# Patient Record
Sex: FEMALE | Race: WHITE | NOT HISPANIC OR LATINO | Employment: FULL TIME | URBAN - METROPOLITAN AREA
[De-identification: names, ages, dates, MRNs, and addresses within clinical notes are randomized per-mention and may not be internally consistent; named-entity substitution may affect disease eponyms.]

---

## 2023-03-24 ENCOUNTER — TELEPHONE (OUTPATIENT)
Dept: TRANSPLANT | Facility: CLINIC | Age: 63
End: 2023-03-24

## 2023-03-24 NOTE — TELEPHONE ENCOUNTER
Lung transplant referral received from Dr. Salter's office.  Message sent to provider.  Awaiting recommendations.

## 2023-03-24 NOTE — TELEPHONE ENCOUNTER
----- Message from Anibal Rodriguez MA sent at 3/24/2023  2:03 PM CDT -----  Regarding: Patient status  Contact: [phone]  Patient is calling to ask some questions, she has paperwork that was faxed over for transplant. Please call and advise.    Contacted patient.  She had questions regarding the evaluation testing for lung transplantation.  Notified patient of our transplant process. Informed her that the referral was received this week and I am waiting on review from Dr. Chong.  Informed her that if a consult appointment is warranted, then financial clearance will be obtained from her insurance for the consult visit and testing.  Notified her of the potential testing that may be ordered.  Informed patient that if she is in the window for lung transplant consideration during the initial visit, then financial clearance for the evaluation testing will need to be scheduled.  Informed patient that I will contact her with the outcome of the review.  She verbalized her complete understanding.

## 2023-03-27 ENCOUNTER — TELEPHONE (OUTPATIENT)
Dept: TRANSPLANT | Facility: CLINIC | Age: 63
End: 2023-03-27
Payer: COMMERCIAL

## 2023-03-27 NOTE — TELEPHONE ENCOUNTER
"3/27/23 - Message sent to [facility] regarding financial clearance for consult with PFTs, 6 minute walk test, CT of chest and TTE.    ----- Message from Abigail Chong DO sent at 3/25/2023  9:58 AM CDT -----  Regarding: RE: Lung transplant referral    Appropriate for LUT clinic.  Will need PFTs, 6MWT, CT chest, and TTE.  Thanks    ----- Message -----  From: Bindu Sterling RN  Sent: 3/24/2023   4:04 PM CDT  To: Abigail Chong DO  Subject: Lung transplant referral                         Lung Transplant Referral Note    Referral from:  Tahir Salter MD    Lung diagnosis: COPD/Emphysema,     Age: 63 y.o.    Height/Weight/BMI:  HT:  5'3" / WT:  67.59 kg / BMI 26.39    Smoking history: Social History    Tobacco Use - former smoker      Smoking status: 1 ppd x 25 years, quit 2006      Smokeless tobacco: Not on file    PFT date:  03/13/23  FVC         2.46 (81%)  FEV1       0.83 (35%)  FEV1/FVC    34%  DLCO      8.48 (42%)    CXR date: not received  Impression:    Chest CT date: 03/31/21  Impression:  COPD. Old calcified granulomatous disease changes. No acute disease.    Echo date:   not received  Impression:    Other pertinent medical history:  HTN, DM, hypercholesterolemia.    Recommendations?          "

## 2023-03-28 NOTE — TELEPHONE ENCOUNTER
"----- Message from Abigail Chong DO sent at 3/28/2023  8:47 AM CDT -----  Regarding: RE: Lung transplant referral    Yes she can bring a copy of her CT chest if it's already scheduled.  No need for repeat PFTs.  Will need 6MWT though if that was not done as well.  Thanks    ----- Message -----  From: Bindu Sterling RN  Sent: 3/27/2023   3:40 PM CDT  To: Abigail Chong DO  Subject: RE: Lung transplant referral                     I spoke with patient on Friday and she told me that she is scheduled for a CT of the chest this week for surveillance.  If she has the CT of the chest locally this week, can she just bring the images on CD?  Also do we need to repeat PFTs since she just had testing on 3/13/23?  Patient was asking?    ----- Message -----  From: Abigail Chong DO  Sent: 3/25/2023   9:59 AM CDT  To: Bindu Sterling RN  Subject: RE: Lung transplant referral                     Appropriate for LUT clinic.  Will need PFTs, 6MWT, CT chest, and TTE.  Thanks    ----- Message -----  From: Bindu Sterling RN  Sent: 3/24/2023   4:04 PM CDT  To: Abigail Chong DO  Subject: Lung transplant referral                         Lung Transplant Referral Note    Referral from:  Tahir Salter MD    Lung diagnosis: COPD/Emphysema,     Age: 63 y.o.    Height/Weight/BMI:  HT:  5'3" / WT:  67.59 kg / BMI 26.39    Smoking history: Social History    Tobacco Use - former smoker      Smoking status: 1 ppd x 25 years, quit 2006      Smokeless tobacco: Not on file    PFT date:  03/13/23  FVC         2.46 (81%)  FEV1       0.83 (35%)  FEV1/FVC    34%  DLCO      8.48 (42%)    CXR date: not received  Impression:    Chest CT date: 03/31/21  Impression:  COPD. Old calcified granulomatous disease changes. No acute disease.    Echo date:   not received  Impression:    Other pertinent medical history:  HTN, DM, hypercholesterolemia.    Recommendations?              "

## 2023-03-31 ENCOUNTER — TELEPHONE (OUTPATIENT)
Dept: TRANSPLANT | Facility: CLINIC | Age: 63
End: 2023-03-31
Payer: COMMERCIAL

## 2023-03-31 DIAGNOSIS — Z76.82 LUNG TRANSPLANT CANDIDATE: ICD-10-CM

## 2023-03-31 DIAGNOSIS — J44.9 CHRONIC OBSTRUCTIVE PULMONARY DISEASE, UNSPECIFIED COPD TYPE: Primary | ICD-10-CM

## 2023-03-31 NOTE — TELEPHONE ENCOUNTER
----- Message from Des Briceño sent at 3/31/2023  1:03 PM CDT -----  Regarding: call back  Pt call to speak with Bindu    Call     Contacted patient and notified her that financial clearance for the consult has been received.  Informed her that Dr. Chong wants her to have an echo and a 6 minute walk test.  Instructed her to bring a CD copy of the CT of the chest.  She verbalized her understanding and stated that she already has the CD of the CT of the chest.  Informed her that an appointment will be scheduled as soon as possible, but it will depend on the availability of the echo.  Informed her that Dr. Chong will be out of the office next Thursday through the following week.  Informed her that she will return on 4/17/23.  Informed her that we could schedule an appointment with Dr. Chong's partner if she would like.  Patient declined and stated that she wanted to see Dr. Chong.  Informed patient that the  will contact her with the date and times of the appointment.  She verbalized her understanding.

## 2023-04-04 ENCOUNTER — TELEPHONE (OUTPATIENT)
Dept: TRANSPLANT | Facility: CLINIC | Age: 63
End: 2023-04-04
Payer: COMMERCIAL

## 2023-04-13 ENCOUNTER — DOCUMENTATION ONLY (OUTPATIENT)
Dept: TRANSPLANT | Facility: CLINIC | Age: 63
End: 2023-04-13
Payer: COMMERCIAL

## 2023-04-17 ENCOUNTER — HOSPITAL ENCOUNTER (OUTPATIENT)
Dept: PULMONOLOGY | Facility: CLINIC | Age: 63
Discharge: HOME | End: 2023-04-17
Payer: COMMERCIAL

## 2023-04-17 ENCOUNTER — HOSPITAL ENCOUNTER (OUTPATIENT)
Dept: CARDIOLOGY | Facility: HOSPITAL | Age: 63
Discharge: HOME | End: 2023-04-17
Attending: INTERNAL MEDICINE
Payer: COMMERCIAL

## 2023-04-17 ENCOUNTER — OFFICE VISIT (OUTPATIENT)
Dept: TRANSPLANT | Facility: CLINIC | Age: 63
End: 2023-04-17
Payer: COMMERCIAL

## 2023-04-17 VITALS
BODY MASS INDEX: 25.62 KG/M2 | WEIGHT: 143.31 LBS | OXYGEN SATURATION: 98 % | HEIGHT: 63 IN | HEIGHT: 63 IN | DIASTOLIC BLOOD PRESSURE: 62 MMHG | SYSTOLIC BLOOD PRESSURE: 95 MMHG | WEIGHT: 144.63 LBS | BODY MASS INDEX: 25.39 KG/M2 | SYSTOLIC BLOOD PRESSURE: 109 MMHG | DIASTOLIC BLOOD PRESSURE: 55 MMHG | HEART RATE: 109 BPM | TEMPERATURE: 98 F | HEART RATE: 122 BPM | RESPIRATION RATE: 20 BRPM

## 2023-04-17 VITALS — WEIGHT: 144.63 LBS | BODY MASS INDEX: 25.62 KG/M2 | HEIGHT: 63 IN

## 2023-04-17 DIAGNOSIS — Z76.82 LUNG TRANSPLANT CANDIDATE: ICD-10-CM

## 2023-04-17 DIAGNOSIS — J44.9 CHRONIC OBSTRUCTIVE PULMONARY DISEASE, UNSPECIFIED COPD TYPE: ICD-10-CM

## 2023-04-17 DIAGNOSIS — Z01.818 ENCOUNTER FOR PRE-TRANSPLANT EVALUATION FOR LUNG TRANSPLANT: ICD-10-CM

## 2023-04-17 DIAGNOSIS — J43.9 PULMONARY EMPHYSEMA, UNSPECIFIED EMPHYSEMA TYPE: ICD-10-CM

## 2023-04-17 DIAGNOSIS — J44.9 CHRONIC OBSTRUCTIVE PULMONARY DISEASE, UNSPECIFIED COPD TYPE: Primary | ICD-10-CM

## 2023-04-17 LAB
ASCENDING AORTA: 3.69 CM
AV INDEX (PROSTH): 0.97
AV MEAN GRADIENT: 3 MMHG
AV PEAK GRADIENT: 6 MMHG
AV VALVE AREA: 3.27 CM2
AV VELOCITY RATIO: 0.92
BSA FOR ECHO PROCEDURE: 1.71 M2
CV ECHO LV RWT: 0.3 CM
DOP CALC AO PEAK VEL: 1.19 M/S
DOP CALC AO VTI: 18.59 CM
DOP CALC LVOT AREA: 3.4 CM2
DOP CALC LVOT DIAMETER: 2.07 CM
DOP CALC LVOT PEAK VEL: 1.09 M/S
DOP CALC LVOT STROKE VOLUME: 60.88 CM3
DOP CALCLVOT PEAK VEL VTI: 18.1 CM
E WAVE DECELERATION TIME: 117.17 MSEC
E/A RATIO: 0.62
E/E' RATIO: 10.46 M/S
ECHO LV POSTERIOR WALL: 0.68 CM (ref 0.6–1.1)
EJECTION FRACTION: 60 %
FRACTIONAL SHORTENING: 39 % (ref 28–44)
INTERVENTRICULAR SEPTUM: 0.67 CM (ref 0.6–1.1)
LA MAJOR: 3.94 CM
LA MINOR: 4.05 CM
LA WIDTH: 2.73 CM
LEFT ATRIUM SIZE: 3.12 CM
LEFT ATRIUM VOLUME INDEX MOD: 10.5 ML/M2
LEFT ATRIUM VOLUME INDEX: 17.2 ML/M2
LEFT ATRIUM VOLUME MOD: 17.58 CM3
LEFT ATRIUM VOLUME: 28.92 CM3
LEFT INTERNAL DIMENSION IN SYSTOLE: 2.77 CM (ref 2.1–4)
LEFT VENTRICLE DIASTOLIC VOLUME INDEX: 56.76 ML/M2
LEFT VENTRICLE DIASTOLIC VOLUME: 95.36 ML
LEFT VENTRICLE MASS INDEX: 56 G/M2
LEFT VENTRICLE SYSTOLIC VOLUME INDEX: 17.1 ML/M2
LEFT VENTRICLE SYSTOLIC VOLUME: 28.79 ML
LEFT VENTRICULAR INTERNAL DIMENSION IN DIASTOLE: 4.56 CM (ref 3.5–6)
LEFT VENTRICULAR MASS: 93.46 G
LV LATERAL E/E' RATIO: 8.5 M/S
LV SEPTAL E/E' RATIO: 13.6 M/S
MV A" WAVE DURATION": 8.18 MSEC
MV PEAK A VEL: 1.09 M/S
MV PEAK E VEL: 0.68 M/S
MV STENOSIS PRESSURE HALF TIME: 33.98 MS
MV VALVE AREA P 1/2 METHOD: 6.47 CM2
PISA TR MAX VEL: 2.34 M/S
PULM VEIN S/D RATIO: 1.47
PV PEAK D VEL: 0.92 M/S
PV PEAK S VEL: 1.35 M/S
RA MAJOR: 3.94 CM
RA PRESSURE: 3 MMHG
RA WIDTH: 3.2 CM
RIGHT VENTRICULAR END-DIASTOLIC DIMENSION: 3.59 CM
RV TISSUE DOPPLER FREE WALL SYSTOLIC VELOCITY 1 (APICAL 4 CHAMBER VIEW): 14.29 CM/S
SINUS: 3.45 CM
STJ: 2.58 CM
TDI LATERAL: 0.08 M/S
TDI SEPTAL: 0.05 M/S
TDI: 0.07 M/S
TR MAX PG: 22 MMHG
TRICUSPID ANNULAR PLANE SYSTOLIC EXCURSION: 1.78 CM
TV REST PULMONARY ARTERY PRESSURE: 25 MMHG

## 2023-04-17 PROCEDURE — 93306 TTE W/DOPPLER COMPLETE: CPT | Mod: TXP

## 2023-04-17 PROCEDURE — 3008F BODY MASS INDEX DOCD: CPT | Mod: CPTII,S$GLB,TXP, | Performed by: INTERNAL MEDICINE

## 2023-04-17 PROCEDURE — 99205 OFFICE O/P NEW HI 60 MIN: CPT | Mod: 25,S$GLB,TXP, | Performed by: INTERNAL MEDICINE

## 2023-04-17 PROCEDURE — 99999 PR PBB SHADOW E&M-EST. PATIENT-LVL III: CPT | Mod: PBBFAC,TXP,, | Performed by: INTERNAL MEDICINE

## 2023-04-17 PROCEDURE — 94618 PULMONARY STRESS TESTING: CPT | Mod: S$GLB,TXP,, | Performed by: INTERNAL MEDICINE

## 2023-04-17 PROCEDURE — 4010F PR ACE/ARB THEARPY RXD/TAKEN: ICD-10-PCS | Mod: CPTII,S$GLB,TXP, | Performed by: INTERNAL MEDICINE

## 2023-04-17 PROCEDURE — 1160F PR REVIEW ALL MEDS BY PRESCRIBER/CLIN PHARMACIST DOCUMENTED: ICD-10-PCS | Mod: CPTII,S$GLB,TXP, | Performed by: INTERNAL MEDICINE

## 2023-04-17 PROCEDURE — 3078F PR MOST RECENT DIASTOLIC BLOOD PRESSURE < 80 MM HG: ICD-10-PCS | Mod: CPTII,S$GLB,TXP, | Performed by: INTERNAL MEDICINE

## 2023-04-17 PROCEDURE — 93306 ECHO (CUPID ONLY): ICD-10-PCS | Mod: 26,TXP,, | Performed by: INTERNAL MEDICINE

## 2023-04-17 PROCEDURE — 94618 PULMONARY STRESS TESTING: ICD-10-PCS | Mod: S$GLB,TXP,, | Performed by: INTERNAL MEDICINE

## 2023-04-17 PROCEDURE — 3074F SYST BP LT 130 MM HG: CPT | Mod: CPTII,S$GLB,TXP, | Performed by: INTERNAL MEDICINE

## 2023-04-17 PROCEDURE — 3078F DIAST BP <80 MM HG: CPT | Mod: CPTII,S$GLB,TXP, | Performed by: INTERNAL MEDICINE

## 2023-04-17 PROCEDURE — 93306 TTE W/DOPPLER COMPLETE: CPT | Mod: 26,TXP,, | Performed by: INTERNAL MEDICINE

## 2023-04-17 PROCEDURE — 1159F MED LIST DOCD IN RCRD: CPT | Mod: CPTII,S$GLB,TXP, | Performed by: INTERNAL MEDICINE

## 2023-04-17 PROCEDURE — 99205 PR OFFICE/OUTPT VISIT, NEW, LEVL V, 60-74 MIN: ICD-10-PCS | Mod: 25,S$GLB,TXP, | Performed by: INTERNAL MEDICINE

## 2023-04-17 PROCEDURE — 1159F PR MEDICATION LIST DOCUMENTED IN MEDICAL RECORD: ICD-10-PCS | Mod: CPTII,S$GLB,TXP, | Performed by: INTERNAL MEDICINE

## 2023-04-17 PROCEDURE — 99999 PR PBB SHADOW E&M-EST. PATIENT-LVL III: ICD-10-PCS | Mod: PBBFAC,TXP,, | Performed by: INTERNAL MEDICINE

## 2023-04-17 PROCEDURE — 4010F ACE/ARB THERAPY RXD/TAKEN: CPT | Mod: CPTII,S$GLB,TXP, | Performed by: INTERNAL MEDICINE

## 2023-04-17 PROCEDURE — 3074F PR MOST RECENT SYSTOLIC BLOOD PRESSURE < 130 MM HG: ICD-10-PCS | Mod: CPTII,S$GLB,TXP, | Performed by: INTERNAL MEDICINE

## 2023-04-17 PROCEDURE — 1160F RVW MEDS BY RX/DR IN RCRD: CPT | Mod: CPTII,S$GLB,TXP, | Performed by: INTERNAL MEDICINE

## 2023-04-17 PROCEDURE — 3008F PR BODY MASS INDEX (BMI) DOCUMENTED: ICD-10-PCS | Mod: CPTII,S$GLB,TXP, | Performed by: INTERNAL MEDICINE

## 2023-04-17 RX ORDER — METFORMIN HYDROCHLORIDE 500 MG/1
500 TABLET, EXTENDED RELEASE ORAL NIGHTLY
COMMUNITY
Start: 2023-02-05

## 2023-04-17 RX ORDER — TIOTROPIUM BROMIDE 18 UG/1
18 CAPSULE ORAL; RESPIRATORY (INHALATION) DAILY
COMMUNITY
Start: 2022-11-03

## 2023-04-17 RX ORDER — NEOMYCIN SULFATE, POLYMYXIN B SULFATE AND DEXAMETHASONE 3.5; 10000; 1 MG/ML; [USP'U]/ML; MG/ML
1 SUSPENSION/ DROPS OPHTHALMIC 4 TIMES DAILY
COMMUNITY
Start: 2023-04-12

## 2023-04-17 RX ORDER — THEOPHYLLINE ANHYDROUS 300 MG/1
300 CAPSULE, EXTENDED RELEASE ORAL DAILY
COMMUNITY
Start: 2022-11-03

## 2023-04-17 RX ORDER — ACETAMINOPHEN 500 MG
1 TABLET ORAL DAILY
COMMUNITY

## 2023-04-17 RX ORDER — BUDESONIDE AND FORMOTEROL FUMARATE DIHYDRATE 160; 4.5 UG/1; UG/1
2 AEROSOL RESPIRATORY (INHALATION) 2 TIMES DAILY
COMMUNITY

## 2023-04-17 RX ORDER — ATORVASTATIN CALCIUM 40 MG/1
40 TABLET, FILM COATED ORAL NIGHTLY
COMMUNITY
Start: 2022-11-14

## 2023-04-17 RX ORDER — DULOXETIN HYDROCHLORIDE 60 MG/1
60 CAPSULE, DELAYED RELEASE ORAL DAILY
COMMUNITY
Start: 2023-03-07

## 2023-04-17 RX ORDER — CYCLOBENZAPRINE HCL 10 MG
10 TABLET ORAL DAILY PRN
COMMUNITY
Start: 2022-11-03

## 2023-04-17 RX ORDER — ALBUTEROL SULFATE 90 UG/1
2 AEROSOL, METERED RESPIRATORY (INHALATION)
COMMUNITY
Start: 2023-04-09

## 2023-04-17 RX ORDER — ENALAPRIL MALEATE 20 MG/1
20 TABLET ORAL DAILY
COMMUNITY
Start: 2023-03-07

## 2023-04-17 RX ORDER — ESOMEPRAZOLE MAGNESIUM 40 MG/1
40 CAPSULE, DELAYED RELEASE ORAL DAILY
COMMUNITY

## 2023-04-17 NOTE — LETTER
April 18, 2023        Tahir Salter  [address]  [address]  [facility] [address]  Phone: [phone]  Fax: [phone]             [facility] - Transplant 1st Fl  [address]  [facility] [address]  Phone: [phone]   Patient: Sanjana Sotomayor   MR Number: [id number]   YOB: 1960   Date of Visit: 4/17/2023       Dear Dr. Tahir Salter    Thank you for referring Sanjana Sotomayor to me for evaluation. Attached you will find relevant portions of my assessment and plan of care.    If you have questions, please do not hesitate to call me. I look forward to following Sanjana Sotomayor along with you.    Sincerely,    Abigail Chong, DO    Enclosure    If you would like to receive this communication electronically, please contact [email or web address] or [phone] to request [employer] access.    [employer] is a tool which provides read-only access to select patient information with whom you have a relationship. Its easy to use and provides real time access to review your patients record including encounter summaries, notes, results, and demographic information.    If you feel you have received this communication in error or would no longer like to receive these types of communications, please e-mail [email or web address]

## 2023-04-17 NOTE — PROCEDURES
Sanjana Sotomayor is a 63 y.o.  female patient, who presents for a 6 minute walk test ordered by DO Castillo.  The diagnosis is COPD.  The patient's BMI is 25.6 kg/m2.  Predicted distance (lower limit of normal) is 345.14 meters.      Test Results:    The test was completed without stopping.  The total time walked was 360 seconds.  During walking, the patient reported:  Dyspnea.  The patient used no assistive devices during testing.     04/17/2023---------Distance: 396.24 meters (1300 feet)     O2 Sat % Supplemental Oxygen Heart Rate Blood Pressure Kodak Scale   Pre-exercise  (Resting) 99 % Room Air 110 bpm 92/55 mmHg 2   During Exercise 94 % Room Air 129 bpm 109/66 mmHg 4   Post-exercise  (Recovery) 98 % Room Air  118 bpm       Recovery Time: 105 seconds    Performing nurse/tech: JARED Cole      PREVIOUS STUDY:   The patient has not had a previous study.      CLINICAL INTERPRETATION:  Six minute walk distance is 396.24 meters (1300 feet) with somewhat heavy dyspnea.  During exercise, there was desaturation while breathing room air.  Both blood pressure and heart rate remained stable with walking.  Tachycardia was present prior to exercise.  The patient did not report non-pulmonary symptoms during exercise.  No previous study performed.  Based upon age and body mass index, exercise capacity is normal.

## 2023-04-17 NOTE — PROGRESS NOTES
LUNG TRANSPLANT INITIAL EVALUATION                                                                                                                                           Reason for Visit:  Evaluation for lung transplant - COPD     Referring Physician: Abigail Chong, *    History of Present Illness: Sanjana Sotomayor is a 63 y.o. female who is on nocturnal 2L of oxygen.  She is on no assisted ventilation.  Her New York Heart Association Class is II and a Karnofsky score of 80% - Normal activity with effort: some symptoms of disease. She is diabetic well controlled.     Pt with hx of COPD started having symptoms around 10 years ago, last exacerbation 4 years ago , never intubated.  Symptoms have progressively getting worst in the last 12 months, more SOB with ADLs like shopping or cleaning. She has a good family support and lives with . Pt compliant with her inhaler regimen : on spiriva and Symbicort.  Beztri  was tried before but pt states she feels better with Spiriva and Symbicort. Per notes she was started on ciaran 24 300mg no improvement on symptoms .  She has been started on nocturnal O2, unable to access records to see if there is any sign of hypercapnia, never ABG per pt.   Update with vaccination, pneumo,  flu and covid vaccines  Former smoker quit 15y ago , 1 PPD x 30 y.  mMRC grade 3 , BRENDA score 5.   She has tried pulmonary rehabilitation  at least 4 times without improvement of symptoms.   Pt has GERD well controlled on Nexium for years.   No changes on weight.   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        Past Medical History:   Diagnosis Date    COPD (chronic obstructive pulmonary disease)     GERD (gastroesophageal reflux disease)     Type 2 diabetes mellitus without complications        Past Surgical History:   Procedure Laterality Date    APPENDECTOMY      CARPAL TUNNEL RELEASE Right     CATARACT EXTRACTION, BILATERAL          Allergies: Patient has no known allergies.    Current Outpatient Medications   Medication Sig    atorvastatin (LIPITOR) 40 MG tablet Take 40 mg by mouth every evening.    budesonide-formoterol 160-4.5 mcg (SYMBICORT) 160-4.5 mcg/actuation HFAA Inhale 2 puffs into the lungs 2 (two) times a day.    cholecalciferol, vitamin D3, (VITAMIN D3) 125 mcg (5,000 unit) Tab Take 1 tablet by mouth once daily.    cyclobenzaprine (FLEXERIL) 10 MG tablet Take 10 mg by mouth daily as needed.    DULoxetine (CYMBALTA) 60 MG capsule Take 60 mg by mouth once daily.    enalapril (VASOTEC) 20 MG tablet Take 20 mg by mouth once daily.    esomeprazole (NEXIUM) 40 MG capsule Take 40 mg by mouth once daily.    metFORMIN (GLUCOPHAGE-XR) 500 MG ER 24hr tablet Take 500 mg by mouth every evening.    multivitamin (THERAGRAN) tablet Take 1 tablet by mouth once daily.    neomycin-polymyxin-dexamethasone (MAXITROL) 3.5mg/mL-10,000 unit/mL-0.1 % DrpS Place 1 drop into both eyes 4 (four) times daily.    SPIRIVA WITH HANDIHALER 18 mcg inhalation capsule Inhale 18 mcg into the lungs once daily.    CHERYL-24 300 mg 24 hr capsule Take 300 mg by mouth once daily.    VENTOLIN HFA 90 mcg/actuation inhaler Inhale 2 puffs into the lungs every 4 to 6 hours as needed.     No current facility-administered medications for this visit.         There is no immunization history on file for this patient.  Family History:    Family History   Problem Relation Age of Onset    NAVDEEP disease Mother     Diabetes Father     Lung disease Father     Cancer Brother     Lung cancer Brother     Heart disease Brother      Social History     Substance and Sexual Activity   Alcohol Use Never      Social History     Substance and Sexual Activity   Drug Use Never      Social History     Socioeconomic History    Marital status:    Tobacco Use    Smoking status: Former     Packs/day: 1.00     Years: 30.00     Pack years: 30.00     Types: Cigarettes     Start date: 4/11/1974      "Quit date: 2005     Years since quittin.2     Passive exposure: Past    Smokeless tobacco: Never   Substance and Sexual Activity    Alcohol use: Never    Drug use: Never     Review of Systems   Constitutional:  Negative for chills, fever, malaise/fatigue and weight loss.   HENT:  Negative for congestion and sinus pain.    Respiratory:  Positive for cough and shortness of breath. Negative for hemoptysis, sputum production and wheezing.    Cardiovascular:  Negative for chest pain, palpitations, orthopnea and leg swelling.   Gastrointestinal:  Negative for abdominal pain, constipation, diarrhea, heartburn, nausea and vomiting.   Genitourinary:  Negative for frequency and urgency.   Musculoskeletal:  Negative for myalgias and neck pain.   Skin:  Negative for rash.   Neurological:  Negative for dizziness, seizures, weakness and headaches.   Psychiatric/Behavioral:  Negative for depression.    Vitals  /62   Pulse (!) 122   Temp 97.8 °F (36.6 °C) (Oral)   Resp 20   Ht 5' 3" (1.6 m)   Wt 65 kg (143 lb 4.8 oz)   SpO2 98%   BMI 25.38 kg/m²   Physical Exam  HENT:      Head: Normocephalic.      Nose: Nose normal.      Mouth/Throat:      Mouth: Mucous membranes are moist.   Eyes:      Pupils: Pupils are equal, round, and reactive to light.   Cardiovascular:      Rate and Rhythm: Normal rate and regular rhythm.   Pulmonary:      Effort: Pulmonary effort is normal. No respiratory distress.      Breath sounds: No wheezing.   Abdominal:      General: Bowel sounds are normal. There is no distension.      Tenderness: There is no abdominal tenderness.   Musculoskeletal:         General: No swelling or tenderness. Normal range of motion.      Cervical back: Normal range of motion.   Skin:     General: Skin is warm.   Neurological:      General: No focal deficit present.       Labs:  Hospital Outpatient Visit on 2023   Component Date Value    Ascending aorta 2023 3.69     STJ 2023 2.58     AV mean " "gradient 04/17/2023 3     Ao peak joshua 04/17/2023 1.19     Ao VTI 04/17/2023 18.59     IVS 04/17/2023 0.67     LA size 04/17/2023 3.12     Left Atrium Major Axis 04/17/2023 3.94     Left Atrium Minor Axis 04/17/2023 4.05     LVIDd 04/17/2023 4.56     LVIDs 04/17/2023 2.77     LVOT diameter 04/17/2023 2.07     LVOT peak VTI 04/17/2023 18.10     Posterior Wall 04/17/2023 0.68     MV Peak A Joshua 04/17/2023 1.09     E wave deceleration time 04/17/2023 117.17     MV Peak E Joshua 04/17/2023 0.68     PV Peak D Joshua 04/17/2023 0.92     PV Peak S Joshua 04/17/2023 1.35     RA Major Axis 04/17/2023 3.94     RA Width 04/17/2023 3.20     RVDD 04/17/2023 3.59     Sinus 04/17/2023 3.45     TAPSE 04/17/2023 1.78     TR Max Joshua 04/17/2023 2.34     TDI LATERAL 04/17/2023 0.08     TDI SEPTAL 04/17/2023 0.05     LA WIDTH 04/17/2023 2.73     MV stenosis pressure 1/2* 04/17/2023 33.98     LV Diastolic Volume 04/17/2023 95.36     LV Systolic Volume 04/17/2023 28.79     RV S' 04/17/2023 14.29     LVOT peak joshua 04/17/2023 1.09     LA volume (mod) 04/17/2023 17.58     MV "A" wave duration 04/17/2023 8.18     LV LATERAL E/E' RATIO 04/17/2023 8.50     LV SEPTAL E/E' RATIO 04/17/2023 13.60     FS 04/17/2023 39     LA volume 04/17/2023 28.92     LV mass 04/17/2023 93.46     Left Ventricle Relative * 04/17/2023 0.30     AV valve area 04/17/2023 3.27     AV Velocity Ratio 04/17/2023 0.92     AV index (prosthetic) 04/17/2023 0.97     MV valve area p 1/2 meth* 04/17/2023 6.47     E/A ratio 04/17/2023 0.62     Mean e' 04/17/2023 0.07     Pulm vein S/D ratio 04/17/2023 1.47     LVOT area 04/17/2023 3.4     LVOT stroke volume 04/17/2023 60.88     AV peak gradient 04/17/2023 6     E/E' ratio 04/17/2023 10.46     LV Systolic Volume Index 04/17/2023 17.1     LV Diastolic Volume Index 04/17/2023 56.76     LA Volume Index 04/17/2023 17.2     LV Mass Index 04/17/2023 56     Triscuspid Valve Regurgi* 04/17/2023 22     LA Volume Index (Mod) 04/17/2023 10.5     BSA " 04/17/2023 1.71     Right Atrial Pressure (f* 04/17/2023 3     EF 04/17/2023 60     TV rest pulmonary artery* 04/17/2023 25        No flowsheet data found.  6MW 4/17/2023   6MWT Status completed without stopping   Patient Reported Dyspnea   Was O2 used? No   6MW Distance walked (feet) 1300   Distance walked (meters) 396.24   Did patient stop? No   Oxygen Saturation 99   Supplemental Oxygen Room Air   Heart Rate 110   Blood Pressure 92/55   Kodak Dyspnea Rating  light   Oxygen Saturation 99   Supplemental Oxygen Room Air   Heart Rate 129   Blood Pressure 109/66   Kodak Dyspnea Rating  somewhat heavy   Recovery Time (seconds) 105   Oxygen Saturation 98   Supplemental Oxygen Room Air   Heart Rate 118       Imaging:  No results found for this or any previous visit.    Cardiodiagnostics:  TTE 03/2023    The left ventricle is normal in size with normal systolic function.  The estimated ejection fraction is 60%.  Grade I left ventricular diastolic dysfunction.  Normal right ventricular size with low normal right ventricular systolic function.  Mild tricuspid regurgitation.  Normal central venous pressure (3 mmHg).  The estimated PA systolic pressure is 25 mmHg.       Spirometry   06/10/20 FVC 2.49 79% fev1 1 40% dlco 40%  03/04/22FVC 2.51 82% FEV1 1.09 45% RATIO 43   03/13/23  FVC 2.46  81% FEV1 0.83 35%DLCO 42.6%  Assessment:  1. Chronic obstructive pulmonary disease, unspecified COPD type    2. Pulmonary emphysema, unspecified emphysema type    3. Encounter for pre-transplant evaluation for lung transplant      Plan:     Evaluation for lung transplant due COPD GOLD 3D: currently well controlled, no exacerbations in the last 4 years, compliant with triple therapy : Spiriva and Symbicort. Last FEV1 35%. Pt for now doesn't meet the requirements for lung transplant however will need fu with lung transplant clinic in case of progression of disease. Currently no evidence of pulmonary hypertension, no exacerbations , FEV1 > 20%   BRENDA score 5.  Next BMP will look for any sign  of hyperpcapnea and will rec ABG if pt needs and agrees.  Unable to review CT per reading moderate emphysema most likely homogenous , also unable to find RV on the PFTs to see if pt is candidate in a future for LVR.     Appropriate 6MWT 396m no desaturations , adequate recovery of her HR  Pt has try multiple times pulmonary rehab with no improvement of symptoms, still highly encourage to keep doing any type of physical activity   RTC in 6 months or earlier if worsening of symptoms or hospitalization.     Bhavya Arredondo Md  PCCM fellow [place].     Attending Note:     I have seen and evaluated the patient with Dr. Arredondo. Their note reflects the content of our discussion and my plan of care.  Pt sent for initial LUT evaluation for a diagnosis of COPD.  GOLD 3D well controlled with triple therapy and no hospitalizations or intubations.  Currently does not meet disease specific indications for lung transplant.  Will continue to monitor for increase in BRENDA score, development of PH, severe exacerbations with need for mechanical support, and FEV1.  Will attempt to upload imaging from the CD she brought; unfortunately it could not be opened in clinic.  Will further assess based on imaging whether or not she may be a candidate for LVR either by surgery or endobronchial valves.  Will need PFTs (body box) and high res CT to further assess.  Follow-up in 6 months.        Abigail Chong D.O.  Pulmonary/Critical Care and Lung Transplantation  [facility]

## 2023-04-24 DIAGNOSIS — Z76.82 LUNG TRANSPLANT CANDIDATE: ICD-10-CM

## 2023-04-24 DIAGNOSIS — J44.9 CHRONIC OBSTRUCTIVE PULMONARY DISEASE, UNSPECIFIED COPD TYPE: Primary | ICD-10-CM

## 2023-04-25 ENCOUNTER — TELEPHONE (OUTPATIENT)
Dept: TRANSPLANT | Facility: CLINIC | Age: 63
End: 2023-04-25
Payer: COMMERCIAL

## 2023-04-25 NOTE — TELEPHONE ENCOUNTER
----- Message from Tiffany Cunningham sent at 4/25/2023 10:49 AM CDT -----  Regarding: Schedule Appt  Pt calling to schedule a f/u appointment. Please advise, requesting a call back,        [phone] (home)     Contacted patient.  She stated that Dr. Chong wanted to see her back in 6 months.  She was asking to schedule the appointment now.  Informed patient that the schedule for October is not out as of yet.  Informed her that the appointment will be scheduled once the schedule is out.  She verbalized her understanding.

## 2023-12-07 ENCOUNTER — TELEPHONE (OUTPATIENT)
Dept: TRANSPLANT | Facility: CLINIC | Age: 63
End: 2023-12-07
Payer: COMMERCIAL

## 2023-12-07 NOTE — TELEPHONE ENCOUNTER
Attempted to contact patient regarding the status of her lung transplant referral since she canceled all of her appointments in October and has not called back to reschedule. No answer. Left a message inquiring if she wanted to reschedule the appointments or close out the referral. Informed her that our  will need to extend her authorization if we did not close out her referral. Instructed her to call back or to send a message via [facility] to let us know if she wanted to leave the referral open and reschedule her appointments or close out the referral.

## 2024-02-16 ENCOUNTER — DOCUMENTATION ONLY (OUTPATIENT)
Dept: TRANSPLANT | Facility: CLINIC | Age: 64
End: 2024-02-16
Payer: COMMERCIAL

## 2024-02-16 NOTE — PROGRESS NOTES
Letter sent to patient regarding closing out lung transplant episode if no response is received from her in 30 days.

## 2024-02-16 NOTE — LETTER
February 16, 2024    [address] [place] [id number]      Dear Sanjana Sotomayor:    Patient: Sanjana Sotomayor   MR Number: [id number]   YOB: 1960       We hope this letter finds you well. You cancelled your appointment in the lung transplant department that was scheduled for 10/25/23. Please contact us at [phone] to re-schedule your appointment or let us know that you are not interested in lung transplantation. If we do not hear from you within 30 days from the date of this letter, we will close your transplant episode; however, your local pulmonologist can re-refer you any time in the future.                                                                             Sincerely,     Abigail Chong D.O.  Medical Director, Lung Transplant  Pulmonary & Critical Care Medicine  [facility]  [address] [place] [id number]  [phone]

## 2024-03-19 ENCOUNTER — DOCUMENTATION ONLY (OUTPATIENT)
Dept: TRANSPLANT | Facility: CLINIC | Age: 64
End: 2024-03-19
Payer: COMMERCIAL

## 2024-03-19 NOTE — PROGRESS NOTES
Lung transplant episode closed since we haven't received any communication from patient after the cancellation of her appointment in October.